# Patient Record
Sex: FEMALE | Race: WHITE | NOT HISPANIC OR LATINO | Employment: UNEMPLOYED | URBAN - METROPOLITAN AREA
[De-identification: names, ages, dates, MRNs, and addresses within clinical notes are randomized per-mention and may not be internally consistent; named-entity substitution may affect disease eponyms.]

---

## 2019-07-22 PROBLEM — F33.40 MDD (RECURRENT MAJOR DEPRESSIVE DISORDER) IN REMISSION: Status: ACTIVE | Noted: 2019-07-22

## 2019-07-22 PROBLEM — M25.512 CHRONIC LEFT SHOULDER PAIN: Status: ACTIVE | Noted: 2019-07-22

## 2019-07-22 PROBLEM — G89.29 CHRONIC LEFT SHOULDER PAIN: Status: ACTIVE | Noted: 2019-07-22

## 2019-07-22 PROBLEM — E03.9 HYPOTHYROIDISM: Status: ACTIVE | Noted: 2019-07-22

## 2019-07-22 PROBLEM — F41.1 GAD (GENERALIZED ANXIETY DISORDER): Status: ACTIVE | Noted: 2019-07-22

## 2019-07-29 PROBLEM — M54.12 RADICULOPATHY OF CERVICAL REGION: Status: ACTIVE | Noted: 2019-07-29

## 2019-07-29 PROBLEM — M48.02 CERVICAL STENOSIS OF SPINAL CANAL: Status: ACTIVE | Noted: 2019-07-29

## 2019-07-29 PROBLEM — Z98.1 HISTORY OF FUSION OF CERVICAL SPINE: Status: ACTIVE | Noted: 2019-07-29

## 2019-11-10 PROBLEM — G89.29 CHRONIC LEFT-SIDED LOW BACK PAIN WITHOUT SCIATICA: Status: ACTIVE | Noted: 2019-11-10

## 2019-11-10 PROBLEM — M54.50 CHRONIC LEFT-SIDED LOW BACK PAIN WITHOUT SCIATICA: Status: ACTIVE | Noted: 2019-11-10

## 2019-12-04 PROBLEM — F33.1 MODERATE EPISODE OF RECURRENT MAJOR DEPRESSIVE DISORDER: Status: ACTIVE | Noted: 2019-12-04

## 2019-12-04 PROBLEM — F41.1 GENERALIZED ANXIETY DISORDER: Chronic | Status: ACTIVE | Noted: 2019-07-22

## 2019-12-04 PROBLEM — F33.1 MODERATE EPISODE OF RECURRENT MAJOR DEPRESSIVE DISORDER: Chronic | Status: ACTIVE | Noted: 2019-12-04

## 2020-08-26 PROBLEM — F33.40 MDD (RECURRENT MAJOR DEPRESSIVE DISORDER) IN REMISSION: Status: ACTIVE | Noted: 2020-08-26

## 2020-09-24 PROBLEM — Z79.899 LONG-TERM USE OF HIGH-RISK MEDICATION: Status: ACTIVE | Noted: 2020-09-24

## 2020-11-17 PROBLEM — Z87.42 PERSONAL HISTORY OF ENDOMETRIOSIS: Status: ACTIVE | Noted: 2019-07-22

## 2020-11-17 PROBLEM — F33.1 MODERATE EPISODE OF RECURRENT MAJOR DEPRESSIVE DISORDER: Chronic | Status: RESOLVED | Noted: 2019-12-04 | Resolved: 2020-11-17

## 2020-11-17 PROBLEM — E03.1 CONGENITAL HYPOTHYROIDISM WITHOUT GOITER: Chronic | Status: ACTIVE | Noted: 2019-07-22

## 2021-04-07 PROBLEM — Z79.899 MEDICAL MARIJUANA USE: Status: ACTIVE | Noted: 2021-04-07

## 2021-04-28 DIAGNOSIS — I10 HYPERTENSION: ICD-10-CM

## 2021-05-12 ENCOUNTER — PATIENT MESSAGE (OUTPATIENT)
Dept: RESEARCH | Facility: HOSPITAL | Age: 56
End: 2021-05-12

## 2021-05-21 PROBLEM — L57.0 ACTINIC KERATOSIS DUE TO EXPOSURE TO SUNLIGHT: Chronic | Status: ACTIVE | Noted: 2021-05-21

## 2021-05-21 PROBLEM — G43.009 MIGRAINE WITHOUT AURA AND WITHOUT STATUS MIGRAINOSUS, NOT INTRACTABLE: Chronic | Status: ACTIVE | Noted: 2021-05-21

## 2021-06-10 PROBLEM — F33.3 MDD (MAJOR DEPRESSIVE DISORDER), RECURRENT, SEVERE, WITH PSYCHOSIS: Status: ACTIVE | Noted: 2021-06-10

## 2021-06-16 PROBLEM — F33.40 MDD (RECURRENT MAJOR DEPRESSIVE DISORDER) IN REMISSION: Status: RESOLVED | Noted: 2020-08-26 | Resolved: 2021-06-16

## 2021-06-16 PROBLEM — E03.1 CONGENITAL HYPOTHYROIDISM WITHOUT GOITER: Status: ACTIVE | Noted: 2019-07-22

## 2021-07-06 ENCOUNTER — SPECIALTY PHARMACY (OUTPATIENT)
Dept: PHARMACY | Facility: CLINIC | Age: 56
End: 2021-07-06

## 2021-07-14 ENCOUNTER — SPECIALTY PHARMACY (OUTPATIENT)
Dept: PHARMACY | Facility: CLINIC | Age: 56
End: 2021-07-14

## 2021-07-14 RX ORDER — ACETAMINOPHEN 500 MG
2 TABLET ORAL
COMMUNITY

## 2021-07-26 ENCOUNTER — SPECIALTY PHARMACY (OUTPATIENT)
Dept: PHARMACY | Facility: CLINIC | Age: 56
End: 2021-07-26

## 2021-08-06 ENCOUNTER — SPECIALTY PHARMACY (OUTPATIENT)
Dept: PHARMACY | Facility: CLINIC | Age: 56
End: 2021-08-06

## 2021-08-11 ENCOUNTER — TELEPHONE (OUTPATIENT)
Dept: PHARMACY | Facility: CLINIC | Age: 56
End: 2021-08-11

## 2021-08-12 PROBLEM — F60.3 BORDERLINE PERSONALITY DISORDER: Status: ACTIVE | Noted: 2021-08-12

## 2021-09-13 ENCOUNTER — SPECIALTY PHARMACY (OUTPATIENT)
Dept: PHARMACY | Facility: CLINIC | Age: 56
End: 2021-09-13

## 2021-10-12 PROBLEM — G89.29 CHRONIC PAIN: Status: ACTIVE | Noted: 2021-10-12

## 2022-04-30 PROBLEM — E03.9 ACQUIRED HYPOTHYROIDISM: Chronic | Status: ACTIVE | Noted: 2022-04-30

## 2022-06-20 PROBLEM — E78.5 HYPERLIPIDEMIA: Status: ACTIVE | Noted: 2022-06-20

## 2023-01-04 DIAGNOSIS — Z12.31 OTHER SCREENING MAMMOGRAM: ICD-10-CM

## 2023-09-21 PROBLEM — D12.2 ADENOMATOUS POLYP OF ASCENDING COLON: Status: ACTIVE | Noted: 2023-09-21

## 2023-09-21 PROBLEM — K63.5 POLYP OF COLON: Status: ACTIVE | Noted: 2023-09-21

## 2023-09-22 PROBLEM — I25.10 CAD (CORONARY ARTERY DISEASE): Status: ACTIVE | Noted: 2023-09-22

## 2023-09-22 PROBLEM — D72.829 LEUKOCYTOSIS: Status: ACTIVE | Noted: 2023-09-22

## 2023-09-22 PROBLEM — I50.20 SYSTOLIC CONGESTIVE HEART FAILURE: Status: ACTIVE | Noted: 2023-09-22

## 2023-09-22 PROBLEM — F17.200 NICOTINE DEPENDENCE: Status: ACTIVE | Noted: 2023-09-22

## 2023-09-22 PROBLEM — R10.30 LOWER ABDOMINAL PAIN: Status: ACTIVE | Noted: 2023-09-22

## 2023-09-23 PROBLEM — T79.7XXA: Status: ACTIVE | Noted: 2023-09-23

## 2023-09-23 PROBLEM — R11.2 NAUSEA AND VOMITING: Status: ACTIVE | Noted: 2023-09-23

## 2023-09-23 PROBLEM — I21.3 ST ELEVATION MYOCARDIAL INFARCTION (STEMI): Status: ACTIVE | Noted: 2023-09-23

## 2023-09-23 PROBLEM — R10.32 LEFT LOWER QUADRANT ABDOMINAL PAIN: Status: ACTIVE | Noted: 2023-09-22

## 2023-09-23 PROBLEM — R10.31 GROIN DISCOMFORT, RIGHT: Status: ACTIVE | Noted: 2023-09-23

## 2023-09-23 PROBLEM — E86.1 INTRAVASCULAR VOLUME DEPLETION: Status: ACTIVE | Noted: 2023-09-23

## 2023-09-24 PROBLEM — R78.81 GRAM-POSITIVE BACTEREMIA: Status: ACTIVE | Noted: 2023-09-24

## 2023-09-25 PROBLEM — F32.A DEPRESSION: Status: ACTIVE | Noted: 2023-09-25

## 2023-09-25 PROBLEM — R94.31 PROLONGED Q-T INTERVAL ON ECG: Status: ACTIVE | Noted: 2023-09-25

## 2023-09-26 PROBLEM — R07.2 PRECORDIAL PAIN: Status: ACTIVE | Noted: 2023-09-26

## 2023-09-26 PROBLEM — D18.03 LIVER HEMANGIOMA: Status: ACTIVE | Noted: 2023-09-26

## 2023-09-26 PROBLEM — R10.84 GENERALIZED ABDOMINAL PAIN: Status: ACTIVE | Noted: 2023-09-26

## 2023-11-12 PROBLEM — I51.81 TAKOTSUBO CARDIOMYOPATHY: Status: ACTIVE | Noted: 2023-11-12

## 2023-12-25 PROBLEM — I21.3 ST ELEVATION MYOCARDIAL INFARCTION (STEMI): Status: RESOLVED | Noted: 2023-09-23 | Resolved: 2023-12-25

## 2024-05-16 PROBLEM — R00.0 TACHYCARDIA: Status: ACTIVE | Noted: 2024-05-16

## 2024-12-12 PROBLEM — Z59.811: Status: ACTIVE | Noted: 2024-12-12

## 2024-12-13 ENCOUNTER — OUTPATIENT CASE MANAGEMENT (OUTPATIENT)
Dept: ADMINISTRATIVE | Facility: OTHER | Age: 59
End: 2024-12-13
Payer: MEDICAID

## 2024-12-13 NOTE — PROGRESS NOTES
Outpatient Care Management   - Patient Assessment    Patient: Staci Gresham  MRN:  [id number]  Date of Service:  12/13/2024  Completed by:  MATEO Thompson  Referral Date:     Reason for Visit   Patient presents with    OPCM Enrollment Call    Social Work Assessment       Brief Summary:  received a referral from outpatient provider for the following Low/Mod SW psychosocial needs SW received consult from the PA-C regarding resources for pt on shelter/housing.SW made phone call to pt([phone])informed her of the consult from the PA-C.SW discuss with pt shelter/housing resources.Pt ask SW to mailed the information to her. SW verify pt's address and resource information was mailed. SW discuss with pt our outpatient care management program and ask pt if she would like for SW to enroll her;per pt yes.Care plan was created in collaboration with patient/caregiver input.   completed the SDOH questionnaire.

## 2024-12-13 NOTE — PROGRESS NOTES
SW received consult from the PA-C regarding resources for shelter/placement. SW made phone call to pt([phone])informed her of the consult from the PA-C.SW discuss with resources that available in the [facility] for shelter/placement. Pt asked  SW could the resources be mailed to her address.SW verify pt address in epic with pt which was corrected. SW discuss with pt our outpatient case management program and ask if she want to be enroll.Pt ask SW to call her back today at 2:00 pm regarding the enrolling. SW informed pt will call her back today. SW mailed the resources.SW will continue to follow up.    MATEO Thompson  Desk:[phone]  Fax:[phone]

## 2024-12-20 ENCOUNTER — OUTPATIENT CASE MANAGEMENT (OUTPATIENT)
Dept: ADMINISTRATIVE | Facility: OTHER | Age: 59
End: 2024-12-20
Payer: MEDICAID

## 2024-12-27 ENCOUNTER — OUTPATIENT CASE MANAGEMENT (OUTPATIENT)
Dept: ADMINISTRATIVE | Facility: OTHER | Age: 59
End: 2024-12-27
Payer: MEDICAID

## 2025-01-07 ENCOUNTER — OUTPATIENT CASE MANAGEMENT (OUTPATIENT)
Dept: ADMINISTRATIVE | Facility: OTHER | Age: 60
End: 2025-01-07
Payer: MEDICAID

## 2025-01-07 NOTE — PROGRESS NOTES
Outpatient Care Management   - Care Plan Follow Up    Patient: Staci Gresham  MRN:  [id number]  Date of Service:  1/7/2025  Completed by:  MATEO Thompson  Referral Date:     Reason for Visit   Patient presents with    OPCM SW Follow Up Call       Brief Summary: SW made a follow up phone call to pt([phone])regarding employment and housing statue. Pt informed FRANK still have not found another job but will be going to a job fair on tomorrow(01/08/2025). Pt informed FRANK had to sale some of her jewelry to pay the rent. SW discuss with pt regarding seeking a counselor and offer to provide her with some counseling resources but decline the resources. Pt thank FRANK for the call but stated she has to get back to her job([employer]) before she get fired. SW will continue to follow up.      Complex Care Plan     Care plan was discussed and completed today with input from patient and/or caregiver.    Patient Instructions     No follow-ups on file.

## 2025-01-14 ENCOUNTER — OUTPATIENT CASE MANAGEMENT (OUTPATIENT)
Dept: ADMINISTRATIVE | Facility: OTHER | Age: 60
End: 2025-01-14
Payer: MEDICAID

## 2025-01-21 ENCOUNTER — OUTPATIENT CASE MANAGEMENT (OUTPATIENT)
Dept: ADMINISTRATIVE | Facility: OTHER | Age: 60
End: 2025-01-21
Payer: MEDICAID

## 2025-01-21 NOTE — PROGRESS NOTES
Outpatient Care Management   - Care Plan Follow Up    Patient: Staci Gresham  MRN:  [id number]  Date of Service:  1/21/2025  Completed by:  MATEO Thompson  Referral Date:     No chief complaint on file.      Brief Summary: SW made phone call to pt unable to reach or leave a message,Pt mail box is full at this time and not accept message at this time.    Complex Care Plan     Care plan was discussed and completed today with input from patient and/or caregiver.    Patient Instructions     No follow-ups on file.

## 2025-01-28 ENCOUNTER — OUTPATIENT CASE MANAGEMENT (OUTPATIENT)
Dept: ADMINISTRATIVE | Facility: OTHER | Age: 60
End: 2025-01-28
Payer: MEDICAID

## 2025-01-28 NOTE — PROGRESS NOTES
Outpatient Care Management   - Care Plan Follow Up    Patient: Staci Gresham  MRN:  [id number]  Date of Service:  1/28/2025  Completed by:  MATEO Thompson  Referral Date:     Reason for Visit   Patient presents with    OPCM SW Follow Up Call       Brief Summary: SW made phone call to pt regarding job status. Pt informed SW still looking for a job have not heard back from any of the jobs she applying for.SW discuss with pt regarding utilities bills, housing,jobs. Pt  informed SW did call the [employer] and spoke with them about assist with her utilities bill. Pt informed SW a lot of the program that provide services for low income individuals are being cut. SW will continue to follow up.      Complex Care Plan     Care plan was discussed and completed today with input from patient and/or caregiver.    Patient Instructions     No follow-ups on file.

## 2025-01-31 PROBLEM — S01.511A LIP LACERATION: Status: ACTIVE | Noted: 2025-01-31

## 2025-02-05 ENCOUNTER — OUTPATIENT CASE MANAGEMENT (OUTPATIENT)
Dept: ADMINISTRATIVE | Facility: OTHER | Age: 60
End: 2025-02-05
Payer: MEDICAID

## 2025-02-05 NOTE — PROGRESS NOTES
Outpatient Care Management   - Care Plan Follow Up    Patient: Staci rGesham  MRN:  [id number]  Date of Service:  2/5/2025  Completed by:  MATEO Thompson  Referral Date:     Reason for Visit   Patient presents with    OPCM SW Follow Up Call       Brief Summary: SW made follow up call to pt and provide her with a career-building site that available at the [facility] and to update her resume.Pt thank FRANK for the information. Pt informed SW still have her job during [employer].SW will continue to follow up    Complex Care Plan     Care plan was discussed and completed today with input from patient and/or caregiver.    Patient Instructions     No follow-ups on file.